# Patient Record
Sex: FEMALE | URBAN - METROPOLITAN AREA
[De-identification: names, ages, dates, MRNs, and addresses within clinical notes are randomized per-mention and may not be internally consistent; named-entity substitution may affect disease eponyms.]

---

## 2017-01-01 ENCOUNTER — HOSPITAL LABORATORY (OUTPATIENT)
Dept: NURSING HOME | Facility: OTHER | Age: 82
End: 2017-01-01

## 2017-01-01 ENCOUNTER — DISCHARGE SUMMARY NURSING HOME (OUTPATIENT)
Dept: GERIATRICS | Facility: CLINIC | Age: 82
End: 2017-01-01
Payer: COMMERCIAL

## 2017-01-01 ENCOUNTER — NURSING HOME VISIT (OUTPATIENT)
Dept: GERIATRICS | Facility: CLINIC | Age: 82
End: 2017-01-01
Payer: COMMERCIAL

## 2017-01-01 VITALS
RESPIRATION RATE: 17 BRPM | SYSTOLIC BLOOD PRESSURE: 119 MMHG | HEIGHT: 60 IN | WEIGHT: 120.5 LBS | BODY MASS INDEX: 23.66 KG/M2 | TEMPERATURE: 97.6 F | DIASTOLIC BLOOD PRESSURE: 73 MMHG | HEART RATE: 90 BPM

## 2017-01-01 VITALS
RESPIRATION RATE: 20 BRPM | HEART RATE: 84 BPM | DIASTOLIC BLOOD PRESSURE: 78 MMHG | TEMPERATURE: 96.2 F | WEIGHT: 117.6 LBS | OXYGEN SATURATION: 98 % | SYSTOLIC BLOOD PRESSURE: 135 MMHG | BODY MASS INDEX: 22.97 KG/M2

## 2017-01-01 DIAGNOSIS — H40.003 GLAUCOMA SUSPECT, BILATERAL: ICD-10-CM

## 2017-01-01 DIAGNOSIS — I10 ESSENTIAL HYPERTENSION: Primary | ICD-10-CM

## 2017-01-01 DIAGNOSIS — F33.9 EPISODE OF RECURRENT MAJOR DEPRESSIVE DISORDER, UNSPECIFIED DEPRESSION EPISODE SEVERITY (H): ICD-10-CM

## 2017-01-01 DIAGNOSIS — Z86.79 HX OF ATRIAL FIBRILLATION, NO CURRENT MEDICATION: ICD-10-CM

## 2017-01-01 DIAGNOSIS — D50.9 IRON DEFICIENCY ANEMIA, UNSPECIFIED IRON DEFICIENCY ANEMIA TYPE: ICD-10-CM

## 2017-01-01 DIAGNOSIS — I25.10 CORONARY ARTERY DISEASE INVOLVING NATIVE CORONARY ARTERY OF NATIVE HEART WITHOUT ANGINA PECTORIS: ICD-10-CM

## 2017-01-01 DIAGNOSIS — N30.00 ACUTE CYSTITIS WITHOUT HEMATURIA: ICD-10-CM

## 2017-01-01 DIAGNOSIS — R62.7 FAILURE TO THRIVE IN ADULT: ICD-10-CM

## 2017-01-01 DIAGNOSIS — E78.5 HYPERLIPIDEMIA LDL GOAL <100: ICD-10-CM

## 2017-01-01 DIAGNOSIS — I10 ESSENTIAL HYPERTENSION: ICD-10-CM

## 2017-01-01 DIAGNOSIS — R62.7 FAILURE TO THRIVE IN ADULT: Primary | ICD-10-CM

## 2017-01-01 LAB
ANION GAP SERPL CALCULATED.3IONS-SCNC: 10 MMOL/L (ref 3–14)
BACTERIA SPEC CULT: NORMAL
BUN SERPL-MCNC: 46 MG/DL (ref 7–30)
CALCIUM SERPL-MCNC: 8.9 MG/DL (ref 8.5–10.1)
CHLORIDE SERPL-SCNC: 114 MMOL/L (ref 94–109)
CO2 SERPL-SCNC: 15 MMOL/L (ref 20–32)
CREAT SERPL-MCNC: 3.95 MG/DL (ref 0.52–1.04)
DIFFERENTIAL METHOD BLD: ABNORMAL
ERYTHROCYTE [DISTWIDTH] IN BLOOD BY AUTOMATED COUNT: 14.8 % (ref 10–15)
GFR SERPL CREATININE-BSD FRML MDRD: 11 ML/MIN/1.7M2
GLUCOSE SERPL-MCNC: 75 MG/DL (ref 70–99)
HCT VFR BLD AUTO: 24 % (ref 35–47)
HGB BLD-MCNC: 7.4 G/DL (ref 11.7–15.7)
Lab: NORMAL
MCH RBC QN AUTO: 30.7 PG (ref 26.5–33)
MCHC RBC AUTO-ENTMCNC: 30.8 G/DL (ref 31.5–36.5)
MCV RBC AUTO: 100 FL (ref 78–100)
PLATELET # BLD AUTO: 171 10E9/L (ref 150–450)
POTASSIUM SERPL-SCNC: 5.3 MMOL/L (ref 3.4–5.3)
RBC # BLD AUTO: 2.41 10E12/L (ref 3.8–5.2)
SODIUM SERPL-SCNC: 139 MMOL/L (ref 133–144)
SPECIMEN SOURCE: NORMAL
WBC # BLD AUTO: 6.4 10E9/L (ref 4–11)

## 2017-01-01 PROCEDURE — 99315 NF DSCHRG MGMT 30 MIN/LESS: CPT | Performed by: NURSE PRACTITIONER

## 2017-01-01 PROCEDURE — 99310 SBSQ NF CARE HIGH MDM 45: CPT | Performed by: NURSE PRACTITIONER

## 2017-01-01 RX ORDER — POLYETHYLENE GLYCOL 3350 17 G/17G
17 POWDER, FOR SOLUTION ORAL DAILY PRN
COMMUNITY

## 2017-01-01 RX ORDER — RIVASTIGMINE 4.6 MG/24H
1 PATCH, EXTENDED RELEASE TRANSDERMAL DAILY
COMMUNITY

## 2017-11-21 NOTE — MR AVS SNAPSHOT
"              After Visit Summary   11/21/2017    Alicia Reyes    MRN: [id number]           Patient Information     Date Of Birth          7/24/1927        Visit Information        Provider Department      11/21/2017 12:30 PM Nissa Khan APRN CNP Geriatrics Transitional Care        Today's Diagnoses     Failure to thrive in adult    -  1    Essential hypertension        Coronary artery disease involving native coronary artery of native heart without angina pectoris        Hyperlipidemia LDL goal <100        Episode of recurrent major depressive disorder, unspecified depression episode severity (H)        Glaucoma suspect, bilateral        Iron deficiency anemia, unspecified iron deficiency anemia type        Hx of atrial fibrillation, no current medication        Acute cystitis without hematuria           Follow-ups after your visit        Who to contact     If you have questions or need follow up information about today's clinic visit or your schedule please contact GER[facility]AT[facility] directly at [phone].  Normal or non-critical lab and imaging results will be communicated to you by [employer], letter or phone within 4 business days after the clinic has received the results. If you do not hear from us within 7 days, please contact the clinic through [employer] or phone. If you have a critical or abnormal lab result, we will notify you by phone as soon as possible.  Submit refill requests through FAMOCO or call your pharmacy and they will forward the refill request to us. Please allow 3 business days for your refill to be completed.          Additional Information About Your Visit        TransEngenhart Information     FAMOCO lets you send messages to your doctor, view your test results, renew your prescriptions, schedule appointments and more. To sign up, go to [email or web address] . Click on \"Log in\" on the left side of the screen, which will take you to the Welcome page. Then click on \"Sign " "up Now\" on the right side of the page.     You will be asked to enter the access code listed below, as well as some personal information. Please follow the directions to create your username and password.     Your access code is: [email or web address]  Expires: 2018  8:38 AM     Your access code will  in 90 days. If you need help or a new code, please call your [facility] or [phone].        Care EveryWhere ID     This is your Care EveryWhere ID. This could be used by other organizations to access your [place] medical records  [id number]        Your Vitals Were     Pulse Temperature Respirations Height BMI (Body Mass Index)       90 97.6  F (36.4  C) 17 5' (1.524 m) 23.53 kg/m2        Blood Pressure from Last 3 Encounters:   17 119/73   11 145/84    Weight from Last 3 Encounters:   17 120 lb 8 oz (54.7 kg)   11 169 lb (76.7 kg)              Today, you had the following     No orders found for display       Primary Care Provider Office Phone # Fax #    [facility] [phone]       [address]  [facility] [address]        Equal Access to Services     PAKO ROBERTSON : Hadii renaldo ku hadasho Soomaali, waaxda luqadaha, qaybta kaalmada adeegyada, waxay idiin hayhiginio dwyer. So [facility] [phone].    ATENCIÓN: Si habla español, tiene a tomlinson disposición servicios gratuitos de asistencia lingüística. ame al [phone].    We comply with applicable federal civil rights laws and [place] laws. We do not discriminate on the basis of race, color, national origin, age, disability, sex, sexual orientation, or gender identity.            Thank you!     Thank you for choosing GERIATR[facility]ITIONAL CARE  for your care. Our goal is always to provide you with excellent care. Hearing back from our patients is one way we can continue to improve our services. Please take a few minutes to complete the written survey that you may receive in the " mail after your visit with us. Thank you!             Your Updated Medication List - Protect others around you: Learn how to safely use, store and throw away your medicines at www.disposemymeds.org.          This list is accurate as of: 11/21/17 11:59 PM.  Always use your most recent med list.                   Brand Name Dispense Instructions for use Diagnosis    CENTRUM PO      Take by mouth daily        CEPHALEXIN PO      Take 250 mg by mouth daily        polyethylene glycol powder    MIRALAX/GLYCOLAX     Take 17 g by mouth daily as needed for constipation

## 2017-11-21 NOTE — PROGRESS NOTES
"[place] [facility]  PRIMARY CARE PROVIDER AND CLINIC:  Clinic, [facility] [address] / [facility] [address]  Chief Complaint   Patient presents with     Hospital F/U       HPI:    Alicia Reyes is a 89 year old  (7/24/1927),admitted to the [facility]  from [facility].  Hospital stay 11/16/17 through 11/20/17.  Admitted to this facility for  rehab, medical management and nursing care.  HPI information obtained from: facility chart records, facility staff, patient report and Care Everywhere Epic chart review.  Current issues are:        1. Failure to thrive in adult    2. Essential hypertension    3. Coronary artery disease involving native coronary artery of native heart without angina pectoris    4. Hyperlipidemia LDL goal <100    5. Episode of recurrent major depressive disorder, unspecified depression episode severity (H)    6. Glaucoma suspect, bilateral    7. Iron deficiency anemia, unspecified iron deficiency anemia type    8. Hx of atrial fibrillation, no current medication    9. Acute cystitis without hematuria      - Met Alicia today as she was resting in bed.  Aroused easily and repeated a few times \"I want to go home\".  Did not agree with her due to question if she is appropriate to return to community setting due to not being compliant with caring for self.   Alicia will be receiving PT/OT while here for strengthening, neuro re-ed, exercise and functional activity.  Expected she may go home again.  She has a dog at home and is worried about the dogs care.    ASSESSMENT  Principal Problem:  Failure to thrive in adult  Active Problems:  Essential hypertension  Depression  Anemia      PLAN:   89 y.o. female with history of hypertension, depression, anemia, CKD, concerns of competency admitted to hospital with concerns of her home safety.    1. Cognitive concerns: Patient has had ongoing and ?progressive concerns of her " cognitive status and competency. Last admission 11/7 through 11/9 with concerns of vulnerable adult. Neuropsyche testing on last admission and determined to be incompetent to make decisions. This was discussed with her son on last admission.Plan was sent home at discharge with additional home services. He was agreeable to home services, non compliant with meds. Son had wanted to give this one more try. Home care arrived and concerned that [facility] [employer] not set up, appointment missed with no transportation. Sent to the ER.     Discussed with son again 11/17.     He reports not knowing about her appointment in the clinic.     Explained that patient is NOT able to make decisions based on her cognitive testing.     Son will need to make decisions for her.     Vulnerable adult   2. Atrial Fibrillation: admit EKG with afib. NOT anticoagulation candidate given cognitive function and risk.   3. Nausea: Slight nausea this morning. Did not really throw up but just felt slightly nauseated and gaggy. She's not really been taking her Celexa prior to admission. Discontinue medications that may not be offering a lot of benefit  4. Anxiety/depression; patient was on Celexa prior to coming in. Given her noncompliancy will to stop the medication. Suspect she does not necessarily have a strong component of anxiety depression but more related to cognitive dysfunction.  5. UTI: recently diagnosed and not clear she took her meds. Resumed this admission.   6. CKD: severe Stage 4 CKD: reiterated this with her son. She is at risk of ESRD with decision for HD or potential of passing away with advancing ESRD. Son is in agreement that HD would not be the right choice for her. He is in agreement that peacefully passing away would be her desire.   7. Anemia: patient with hemoglobin of 7.4 on 11/18. We'll repeat in of close to 7 may transfuse one unit. Suspect this is more related to her underlying renal insufficiency  8. Code Status: Called  her son and discussed code status. He is in agreement that DNAR/DNI is appopriate. Changed code status to DNAR/DNI      CODE STATUS/ADVANCE DIRECTIVES DISCUSSION:   CPR/Full code   Patient's living condition: lives alone    ALLERGIES:Sulfa drugs and Vicodin [hydrocodone-acetaminophen]  PAST MEDICAL HISTORY:  has no past medical history on file.  PAST SURGICAL HISTORY:  has no past surgical history on file.  FAMILY HISTORY: family history is not on file.  SOCIAL HISTORY:  reports that she has never smoked. She has never used smokeless tobacco. She reports that she drinks alcohol. She reports that she does not use illicit drugs.    Post Discharge Medication Reconciliation Status: discharge medications reconciled, continue medications without change.  Current Outpatient Prescriptions   Medication Sig Dispense Refill     CEPHALEXIN PO Take 250 mg by mouth daily       polyethylene glycol (MIRALAX/GLYCOLAX) powder Take 17 g by mouth daily as needed for constipation       Multiple Vitamins-Minerals (CENTRUM PO) Take by mouth daily          ROS:  10 point ROS of systems including Constitutional, Eyes, Respiratory, Cardiovascular, Gastroenterology, Genitourinary, Integumentary, Muscularskeletal, Psychiatric were all negative except for pertinent positives noted in my HPI.    Exam:  /73  Pulse 90  Temp 97.6  F (36.4  C)  Resp 17  Ht 5' (1.524 m)  Wt 120 lb 8 oz (54.7 kg)  BMI 23.53 kg/m2  GENERAL APPEARANCE:  Alert, in no distress, thin, cooperative  EYES:  PERRL, Conjunctiva and lids normal  RESP:  respiratory effort and palpation of chest normal, lungs clear to auscultation , no respiratory distress  CV:  Palpation and auscultation of heart done , no edema, heart rate regular/irregular but strong  ABDOMEN:  normal bowel sounds, soft, nontender, no hepatosplenomegaly or other masses, no guarding or rebound  M/S:   Gait and station abnormal uses a 4 wheeled walker that she is independent with.  has a w/c in the  room as well  SKIN:  skin is pink, dry and warm.  PSYCH:  oriented to person and place, time was off, memory impaired , mood was a little defensive but understandable.      Lab/Diagnostic data:    CBC (Hgb,Hct,WBC,RBC,Platelet) (11/20/2017 8:00 AM)     CBC (Hgb,Hct,WBC,RBC,Platelet) (11/20/2017 8:00 AM)   Component Value Ref Range   WBC 7.0 4.3 - 10.8 K/uL   RBC 2.45 (L) 4.20 - 5.40 M/uL   HEMOGLOBIN 7.7 (L) 12.0 - 16.0 gm/dL   HEMATOCRIT 24.8 (L) 36.0 - 48.0 %    (H) 80 - 100 fl   MCH 31 27 - 33 pg   MCHC 31 (L) 33 - 36 gm/dL   RDW 14.3 11.5 - 14.5 %   PLATELET COUNT 152 150 - 400 K/uL   MPV 10.9 6.5 - 12.0       Basic Metabolic Profile (11/20/2017 8:00 AM)      Basic Metabolic Profile (11/20/2017 8:00 AM)   Component Value Ref Range   SODIUM 144 136 - 145 mmol/L   POTASSIUM 4.9 3.5 - 5.1 mmol/L   CHLORIDE 120 (H) 98 - 112 mmol/L   CARBON DIOXIDE 15 (L) 21 - 32 mmol/L   BUN (UREA NITRO) 53 (H) 7 - 24 mg/dL   CREATININE 3.67 (H) 0.55 - 1.02 mg/dL   EST GFR (MDRD) 12 (L) >60 mL/min   EST GFR IF  AM 14 (L) >60 mL/min   GLUCOSE 71 (L) 74 - 106 mg/dL   CALCIUM, SERUM 9.2 8.5 - 10.1 mg/dL   ANION GAP 9.0 0.0 - 15.0 mmol/L     LFTs (11/16/2017 4:05 PM)  LFTs (11/16/2017 4:05 PM)   Component Value Ref Range   ALT 16 12 - 68 IU/L   ALKALINE P'TASE 82 45 - 117 IU/L   AST (SGOT) 11 (L) 12 - 37 IU/L   PROTEIN TOTAL 6.6 6.4 - 8.2 g/dL   ALBUMIN 3.0 (L) 3.4 - 5.0 gm/dL   BILIRUBIN-DIRECT 0.10 0.05 - 0.24 mg/dL   BILIRUBIN-TOTAL 0.3 0.2 - 1.0 mg/dL       ASSESSMENT/PLAN:  Failure to thrive in adult  Second time at the hospital with [facility] speaking with son.  Here for strengthening and son expects two weeks about here.  Will see what and how she manages self while here with therapies doing their assessments.  Admission weight 120 lbs and now 117 lbs.  Could be water weight.  Has only been here less than 24 hours.  Will give her some time to adjust before really being able to see her cognition.  Remains on a MVI daily.   3 medications in all.    1.  Will have a CBC and BMP drawn on 11/27/17 after she has been here to see for comparison how her blood work is doing.    Essential hypertension  Blood pressures range form 110-130's/70's.  No medications at this time.  Blood pressures stable.      Coronary artery disease involving native coronary artery of native heart without angina pectoris  No medications.  Not a cadidate for blood thinners.  Risk of falls.  No complaints.    Hyperlipidemia LDL goal <100  No medications.      Episode of recurrent major depressive disorder, unspecified depression episode severity (H)  Had an order for Celexa but due to non-compliance, this was removed.  Will monitor mood and how she interacts with this environment.    Glaucoma suspect, bilateral  No medications.  This was listed on her diagnoses list from [facility].    Iron deficiency anemia, unspecified iron deficiency anemia type  Will have a CBC and BMP done on 11/27/17    Low HgB feel more related to her Stage 4 CKD in which doing dialysis will not be done but rather a peaceful passing.    Hx of atrial fibrillation, no current medication  Not a candidate due to risk of falling and non-compliance.  Monitor for any cardiac symptoms.    Acute cystitis without hematuria  Is on Keflex.  Will have a UC done 3 days after medication done since she has come here with ABX treatment.       Orders:  UC 3 days after ABX done for UTI  CBC and BMP 11/27/17    Total time spent with patient visit at the skilled nursing facility was 35 min including patient visit and review of past records. Greater than 50% of total time spent with counseling and coordinating care due to multiple chronic serious co-morbidities     Electronically signed by:  SHAWNA Crocker CNP3

## 2017-11-27 PROBLEM — R62.7 FAILURE TO THRIVE IN ADULT: Status: ACTIVE | Noted: 2017-01-01

## 2017-11-27 PROBLEM — Z90.13 HX OF BILATERAL MASTECTOMY: Status: ACTIVE | Noted: 2017-01-01

## 2017-11-27 PROBLEM — M17.12 PRIMARY OSTEOARTHRITIS OF LEFT KNEE: Status: ACTIVE | Noted: 2017-01-01

## 2017-11-27 PROBLEM — D50.9 IRON DEFICIENCY ANEMIA, UNSPECIFIED IRON DEFICIENCY ANEMIA TYPE: Status: ACTIVE | Noted: 2017-01-01

## 2017-11-27 PROBLEM — I25.10 CORONARY ARTERY DISEASE INVOLVING NATIVE CORONARY ARTERY OF NATIVE HEART WITHOUT ANGINA PECTORIS: Status: ACTIVE | Noted: 2017-01-01

## 2017-11-27 PROBLEM — E78.5 HYPERLIPIDEMIA LDL GOAL <100: Status: ACTIVE | Noted: 2017-01-01

## 2017-11-27 PROBLEM — Z86.79 HX OF ATRIAL FIBRILLATION, NO CURRENT MEDICATION: Status: ACTIVE | Noted: 2017-01-01

## 2017-11-27 PROBLEM — F33.9 EPISODE OF RECURRENT MAJOR DEPRESSIVE DISORDER, UNSPECIFIED DEPRESSION EPISODE SEVERITY (H): Status: ACTIVE | Noted: 2017-01-01

## 2017-11-27 PROBLEM — H40.003 GLAUCOMA SUSPECT, BILATERAL: Status: ACTIVE | Noted: 2017-01-01

## 2017-11-27 PROBLEM — I10 ESSENTIAL HYPERTENSION: Status: ACTIVE | Noted: 2017-01-01

## 2017-11-27 NOTE — PROGRESS NOTES
[place] [facility] DISCHARGE SUMMARY    PATIENT'S NAME: Alicia Reyes  YOB: 1927  MEDICAL RECORD NUMBER:  [id number]    PRIMARY CARE PROVIDER AND CLINIC RESPONSIBLE AFTER TRANSFER: [facility] [address] / [facility] [address]     CODE STATUS/ADVANCE DIRECTIVES DISCUSSION:   CPR/Full code        Allergies   Allergen Reactions     Sulfa Drugs      Vicodin [Hydrocodone-Acetaminophen]        TRANSFERRING PROVIDERS: SHAWNA Crocker CNP, Greg Schoen, MD  DATE OF SNF ADMISSION:  November / 20 / 2017  DATE OF SNF (anticipated) DISCHARGE: December / 01 / 2017  DISCHARGE DISPOSITION: Non-FM[facility] Provider   [facility] 11/16/17 to 11/20/17.     Condition on Discharge:  Improving.  Function:  Independent with walker  Cognitive Scores: BIMS 13/15 on 11/27/17    Equipment: walker    DISCHARGE DIAGNOSIS:   1. Essential hypertension    2. Failure to thrive in adult    3. Coronary artery disease involving native coronary artery of native heart without angina pectoris    4. Hx of atrial fibrillation, no current medication        HPI Nursing Facility Course:  HPI information obtained from: facility chart records, facility staff, patient report and Care Everywhere Epic chart review.  Essential hypertension  No specific blood pressure medications.  Vitals done while here and B/P's 120-140's/70's.      Failure to thrive in adult  Came from the hospital with this diagnosis.  Attended therapies while here.  Today she appeared alert and talkative.  Will be set up with home care upon discharge to see if transition will go well.   asked for orders early in order to work on them prior to leaving here.    Is on a house supplement while here for extra calories.  Diet was regular.    Coronary artery disease involving native coronary artery of native heart without angina  pectoris  No complaints of chest pain today.  Doing well with therapies.  Is only on three medications.      Hx of atrial fibrillation, no current medication  Not a candidate for coumadin due to non-compliance and risk of falling.  No troubles while here for the short time.      PAST MEDICAL HISTORY:  has no past medical history on file.    DISCHARGE MEDICATIONS:  Current Outpatient Prescriptions   Medication Sig Dispense Refill     rivastigmine (EXELON) 4.6 MG/24HR 24 hr patch Place 1 patch onto the skin daily       polyethylene glycol (MIRALAX/GLYCOLAX) powder Take 17 g by mouth daily as needed for constipation       Multiple Vitamins-Minerals (CENTRUM PO) Take by mouth daily          MEDICATION CHANGES/RATIONALE:   11/21/17  UC to be done 3 days after ABX done, CBC and BMP on 11/27/17 for HTN and hx of atrial fib  11/21/17  OT eval and treat for ADLs, therapeutic exercise and functional mobility  11/27/17  change diet to regular, limit high K+ foods, begin house supplement 120cc BID  11/27/17  Ok to discharge on 12/1/17 with medications.  Home care services:  RN for vitals and medication set up and HHA for bathing.  PT eval and treat for failure to thrive.  Controlled medications sent with patient: not applicable/none     ROS:    4 point ROS including Respiratory, CV, GI and , other than that noted in the HPI,  is negative    Physical Exam:   Vitals: /78  Pulse 84  Temp 96.2  F (35.7  C)  Resp 20  Wt 117 lb 9.6 oz (53.3 kg)  SpO2 98%  BMI 22.97 kg/m2  BMI= Body mass index is 22.97 kg/(m^2).    GENERAL APPEARANCE:  Alert, in no distress, appears healthy, cooperative  EYES:  EOM, conjunctivae, lids, pupils and irises normal, PERRL  NECK:  No adenopathy,masses or thyromegaly  RESP:  respiratory effort and palpation of chest normal, lungs clear to auscultation , no respiratory distress  CV:  Palpation and auscultation of heart done , no edema, heart rate regular/irregular with rate control  ABDOMEN:   normal bowel sounds, soft, nontender, no hepatosplenomegaly or other masses, no guarding or rebound  M/S:   Gait and station abnormal uses walker for ambulating on the unit.  SKIN:  Inspection of skin and subcutaneous tissue baseline, Palpation of skin and subcutaneous tissue baseline  PSYCH:  oriented X 3, memory impaired , affect and mood normal    DISCHARGE PLAN:  Physical Therapy, Registered Nurse and Home Health Aide  Patient instructed to follow-up with:  PCP in 7-10 days       [facility] scheduled appointments:  No future appointments.    MTM referral needed and placed by this provider: No    Pending labs: none    SNF labs CBC RESULTS:   Recent Labs   Lab Test  11/27/17   0759  06/30/11   1739   WBC  6.4  12.0*   RBC  2.41*  3.62*   HGB  7.4*  11.2*   HCT  24.0*  33.8*   MCV  100  93   MCH  30.7  30.9   MCHC  30.8*  33.1   RDW  14.8  14.3   PLT  171  213       Last Basic Metabolic Panel:  Recent Labs   Lab Test  11/27/17   0759   NA  139   POTASSIUM  5.3   CHLORIDE  114*   MILES  8.9   CO2  15*   BUN  46*   CR  3.95*   GLC  75     Discharge Treatments: none    TOTAL DISCHARGE TIME:   Less than or equal to 30 minutes  Electronically signed by:  SHAWNA Crocker CNP